# Patient Record
Sex: FEMALE | Race: WHITE | NOT HISPANIC OR LATINO | URBAN - METROPOLITAN AREA
[De-identification: names, ages, dates, MRNs, and addresses within clinical notes are randomized per-mention and may not be internally consistent; named-entity substitution may affect disease eponyms.]

---

## 2022-04-15 ENCOUNTER — APPOINTMENT (RX ONLY)
Dept: URBAN - METROPOLITAN AREA CLINIC 288 | Facility: CLINIC | Age: 72
Setting detail: DERMATOLOGY
End: 2022-04-15

## 2022-04-15 DIAGNOSIS — R21 RASH AND OTHER NONSPECIFIC SKIN ERUPTION: ICD-10-CM | Status: INADEQUATELY CONTROLLED

## 2022-04-15 PROCEDURE — ? COUNSELING

## 2022-04-15 PROCEDURE — 99203 OFFICE O/P NEW LOW 30 MIN: CPT

## 2022-04-15 PROCEDURE — ? PRESCRIPTION

## 2022-04-15 PROCEDURE — ? FULL BODY SKIN EXAM - DECLINED

## 2022-04-15 PROCEDURE — ? PRESCRIPTION MEDICATION MANAGEMENT

## 2022-04-15 RX ORDER — HYDROXYZINE HYDROCHLORIDE 25 MG/1
TABLET, FILM COATED ORAL QHS
Qty: 60 | Refills: 1 | Status: ERX | COMMUNITY
Start: 2022-04-15

## 2022-04-15 RX ORDER — CETIRIZINE HCL 10 MG/1
CAPSULE ORAL
Qty: 60 | Refills: 1 | Status: ERX | COMMUNITY
Start: 2022-04-15

## 2022-04-15 RX ORDER — TRIAMCINOLONE ACETONIDE 1 MG/G
CREAM TOPICAL BID
Qty: 80 | Refills: 1 | Status: ERX | COMMUNITY
Start: 2022-04-15

## 2022-04-15 RX ORDER — PREDNISONE 10 MG/1
TABLET ORAL QD
Qty: 33 | Refills: 0 | Status: ERX | COMMUNITY
Start: 2022-04-15

## 2022-04-15 RX ADMIN — TRIAMCINOLONE ACETONIDE: 1 CREAM TOPICAL at 00:00

## 2022-04-15 RX ADMIN — CETIRIZINE HCL: 10 CAPSULE ORAL at 00:00

## 2022-04-15 RX ADMIN — HYDROXYZINE HYDROCHLORIDE: 25 TABLET, FILM COATED ORAL at 00:00

## 2022-04-15 RX ADMIN — PREDNISONE: 10 TABLET ORAL at 00:00

## 2022-04-15 ASSESSMENT — LOCATION SIMPLE DESCRIPTION DERM
LOCATION SIMPLE: RIGHT LOWER BACK
LOCATION SIMPLE: CHEST
LOCATION SIMPLE: RIGHT FOREARM
LOCATION SIMPLE: LEFT FOREARM
LOCATION SIMPLE: RIGHT POSTERIOR THIGH
LOCATION SIMPLE: LEFT POSTERIOR THIGH

## 2022-04-15 ASSESSMENT — LOCATION DETAILED DESCRIPTION DERM
LOCATION DETAILED: RIGHT SUPERIOR MEDIAL MIDBACK
LOCATION DETAILED: LEFT MEDIAL SUPERIOR CHEST
LOCATION DETAILED: LEFT VENTRAL PROXIMAL FOREARM
LOCATION DETAILED: RIGHT PROXIMAL POSTERIOR THIGH
LOCATION DETAILED: RIGHT VENTRAL PROXIMAL FOREARM
LOCATION DETAILED: LEFT DISTAL POSTERIOR THIGH

## 2022-04-15 ASSESSMENT — LOCATION ZONE DERM
LOCATION ZONE: ARM
LOCATION ZONE: LEG
LOCATION ZONE: TRUNK

## 2022-04-15 NOTE — PROCEDURE: MIPS QUALITY
Detail Level: Detailed
Quality 226: Preventive Care And Screening: Tobacco Use: Screening And Cessation Intervention: Tobacco Screening not Performed for Medical Reasons
Quality 130: Documentation Of Current Medications In The Medical Record: Current Medications Documented
Quality 128: Preventive Care And Screening: Body Mass Index (Bmi) Screening And Follow-Up Plan: BMI not documented, reason not otherwise specified.
Quality 431: Preventive Care And Screening: Unhealthy Alcohol Use - Screening: Patient not identified as an unhealthy alcohol user when screened for unhealthy alcohol use using a systematic screening method

## 2022-04-15 NOTE — PROCEDURE: PRESCRIPTION MEDICATION MANAGEMENT
Detail Level: Zone
Initiate Treatment: Prednisone 10 mg tablet PO Frequency: QD Sig: take 3 pills daily x 5 days, then take 2 pills daily x 5 days, then take 1 pill daily x 5 days, then 1 pill every other day x 5 days. Take with food.\\nTriamcinolone acetonide 0.1 % topical cream TP Frequency: BID Sig: Apply to rash on body bid for up to 2 weeks, then PRN. Not for face, underarms, or groin.\\nHydroxyzine HCl 25 mg tablet PO Frequency: QHS Sig: Take 1-2 tablets before bed as needed for itching\\n All Day Allergy (cetirizine) 10 mg capsule PO Sig: Take up 2 tablets daily for itching\\n
Render In Strict Bullet Format?: No
Discontinue Regimen: All vitamins, supplements, protein powders and unnecessary medications for now.  Once resolved for 1 mo may add back into routine 1 per week.
Plan: No new meds, dose adjustments, personal care products or recent illnesses.\\nNo history of asthma, allergies, or eczema.\\nRash made worse by scratching\\nShe will follow up in 2 weeks if no resolution or worsening\\nSpironolactone and levothyroxine alternatives if recurrence or no resolution and allergy referral

## 2022-07-11 ENCOUNTER — APPOINTMENT (RX ONLY)
Dept: URBAN - METROPOLITAN AREA CLINIC 288 | Facility: CLINIC | Age: 72
Setting detail: DERMATOLOGY
End: 2022-07-11

## 2022-07-11 DIAGNOSIS — L30.0 NUMMULAR DERMATITIS: ICD-10-CM | Status: INADEQUATELY CONTROLLED

## 2022-07-11 PROCEDURE — ? ADDITIONAL NOTES

## 2022-07-11 PROCEDURE — ? PRESCRIPTION

## 2022-07-11 PROCEDURE — 99214 OFFICE O/P EST MOD 30 MIN: CPT

## 2022-07-11 PROCEDURE — ? COUNSELING

## 2022-07-11 RX ORDER — BETAMETHASONE DIPROPIONATE 0.5 MG/G
CREAM, AUGMENTED TOPICAL
Qty: 50 | Refills: 2 | Status: ERX | COMMUNITY
Start: 2022-07-11

## 2022-07-11 RX ADMIN — BETAMETHASONE DIPROPIONATE: 0.5 CREAM, AUGMENTED TOPICAL at 00:00

## 2022-07-11 NOTE — PROCEDURE: ADDITIONAL NOTES
Detail Level: Simple
Additional Notes: Recommend taking vitamin E\\nPatient thanks hot showers , will discontinue \\nDiscontinue taking claritin and start Xyzal QD\\nNo biopsy performed today, but if no change in two weeks will do a punch biopsy
Render Risk Assessment In Note?: no

## 2022-08-15 ENCOUNTER — APPOINTMENT (RX ONLY)
Dept: URBAN - METROPOLITAN AREA CLINIC 288 | Facility: CLINIC | Age: 72
Setting detail: DERMATOLOGY
End: 2022-08-15

## 2022-08-15 DIAGNOSIS — L30.0 NUMMULAR DERMATITIS: ICD-10-CM | Status: INADEQUATELY CONTROLLED

## 2022-08-15 PROBLEM — L30.9 DERMATITIS, UNSPECIFIED: Status: ACTIVE | Noted: 2022-08-15

## 2022-08-15 PROCEDURE — 11104 PUNCH BX SKIN SINGLE LESION: CPT

## 2022-08-15 PROCEDURE — 11105 PUNCH BX SKIN EA SEP/ADDL: CPT

## 2022-08-15 PROCEDURE — ? COUNSELING

## 2022-08-15 PROCEDURE — ? PRESCRIPTION MEDICATION MANAGEMENT

## 2022-08-15 PROCEDURE — ? BIOPSY BY PUNCH METHOD

## 2022-08-15 PROCEDURE — ? PRESCRIPTION

## 2022-08-15 RX ORDER — HYDROXYZINE HYDROCHLORIDE 25 MG/1
TABLET, FILM COATED ORAL
Qty: 60 | Refills: 1 | Status: ERX

## 2022-08-15 ASSESSMENT — LOCATION SIMPLE DESCRIPTION DERM
LOCATION SIMPLE: RIGHT UPPER BACK
LOCATION SIMPLE: LEFT UPPER BACK

## 2022-08-15 ASSESSMENT — LOCATION DETAILED DESCRIPTION DERM
LOCATION DETAILED: RIGHT SUPERIOR UPPER BACK
LOCATION DETAILED: LEFT MID-UPPER BACK

## 2022-08-15 ASSESSMENT — LOCATION ZONE DERM: LOCATION ZONE: TRUNK

## 2022-08-15 NOTE — PROCEDURE: PRESCRIPTION MEDICATION MANAGEMENT
Plan: No improvement with betamethasone cream\\nConsider MTX
Render In Strict Bullet Format?: No
Detail Level: Zone

## 2022-08-29 ENCOUNTER — APPOINTMENT (RX ONLY)
Dept: URBAN - METROPOLITAN AREA CLINIC 288 | Facility: CLINIC | Age: 72
Setting detail: DERMATOLOGY
End: 2022-08-29

## 2022-08-29 DIAGNOSIS — Z48.02 ENCOUNTER FOR REMOVAL OF SUTURES: ICD-10-CM

## 2022-08-29 PROCEDURE — ? SUTURE REMOVAL (GLOBAL PERIOD)

## 2022-08-29 PROCEDURE — 99024 POSTOP FOLLOW-UP VISIT: CPT

## 2022-08-29 ASSESSMENT — LOCATION ZONE DERM: LOCATION ZONE: TRUNK

## 2022-08-29 ASSESSMENT — LOCATION SIMPLE DESCRIPTION DERM
LOCATION SIMPLE: RIGHT UPPER BACK
LOCATION SIMPLE: LEFT UPPER BACK

## 2022-08-29 ASSESSMENT — LOCATION DETAILED DESCRIPTION DERM
LOCATION DETAILED: RIGHT SUPERIOR UPPER BACK
LOCATION DETAILED: LEFT MID-UPPER BACK

## 2022-08-29 NOTE — PROCEDURE: SUTURE REMOVAL (GLOBAL PERIOD)
Detail Level: Detailed
Add 78500 Cpt? (Important Note: In 2017 The Use Of 56705 Is Being Tracked By Cms To Determine Future Global Period Reimbursement For Global Periods): yes

## 2022-09-15 ENCOUNTER — APPOINTMENT (RX ONLY)
Dept: URBAN - METROPOLITAN AREA CLINIC 288 | Facility: CLINIC | Age: 72
Setting detail: DERMATOLOGY
End: 2022-09-15

## 2022-09-15 NOTE — PROCEDURE: MIPS QUALITY
Quality 226: Preventive Care And Screening: Tobacco Use: Screening And Cessation Intervention: Patient screened for tobacco use and is an ex/non-smoker
Quality 111:Pneumonia Vaccination Status For Older Adults: Pneumococcal vaccine (PPSV23) administered on or after patient’s 60th birthday and before the end of the measurement period
Quality 130: Documentation Of Current Medications In The Medical Record: Current Medications Documented
Quality 431: Preventive Care And Screening: Unhealthy Alcohol Use - Screening: Patient not identified as an unhealthy alcohol user when screened for unhealthy alcohol use using a systematic screening method
Quality 47: Advance Care Plan: Advance Care Planning discussed and documented; advance care plan or surrogate decision maker documented in the medical record.
Detail Level: Detailed

## 2022-10-20 ENCOUNTER — APPOINTMENT (RX ONLY)
Dept: URBAN - METROPOLITAN AREA CLINIC 10 | Facility: CLINIC | Age: 72
Setting detail: DERMATOLOGY
End: 2022-10-20

## 2022-10-20 DIAGNOSIS — L21.8 OTHER SEBORRHEIC DERMATITIS: ICD-10-CM | Status: INADEQUATELY CONTROLLED

## 2022-10-20 DIAGNOSIS — L65.0 TELOGEN EFFLUVIUM: ICD-10-CM | Status: INADEQUATELY CONTROLLED

## 2022-10-20 DIAGNOSIS — L64.8 OTHER ANDROGENIC ALOPECIA: ICD-10-CM | Status: INADEQUATELY CONTROLLED

## 2022-10-20 PROCEDURE — 99214 OFFICE O/P EST MOD 30 MIN: CPT

## 2022-10-20 PROCEDURE — ? COUNSELING

## 2022-10-20 PROCEDURE — ? PRESCRIPTION

## 2022-10-20 PROCEDURE — ? PRESCRIPTION MEDICATION MANAGEMENT

## 2022-10-20 RX ORDER — KETOCONAZOLE 20 MG/ML
2% SHAMPOO, SUSPENSION TOPICAL BID
Qty: 120 | Refills: 5 | Status: ERX | COMMUNITY
Start: 2022-10-20

## 2022-10-20 RX ORDER — FLUOCINOLONE ACETONIDE 0.11 MG/ML
0.01% OIL TOPICAL
Qty: 118.28 | Refills: 3 | Status: ERX | COMMUNITY
Start: 2022-10-20

## 2022-10-20 RX ADMIN — FLUOCINOLONE ACETONIDE 0.01%: 0.11 OIL TOPICAL at 00:00

## 2022-10-20 RX ADMIN — KETOCONAZOLE 2%: 20 SHAMPOO, SUSPENSION TOPICAL at 00:00

## 2022-10-20 ASSESSMENT — LOCATION DETAILED DESCRIPTION DERM
LOCATION DETAILED: RIGHT MEDIAL FRONTAL SCALP
LOCATION DETAILED: SUPERIOR MID FOREHEAD

## 2022-10-20 ASSESSMENT — LOCATION SIMPLE DESCRIPTION DERM
LOCATION SIMPLE: SUPERIOR FOREHEAD
LOCATION SIMPLE: RIGHT SCALP

## 2022-10-20 ASSESSMENT — LOCATION ZONE DERM
LOCATION ZONE: FACE
LOCATION ZONE: SCALP

## 2022-10-20 NOTE — PROCEDURE: PRESCRIPTION MEDICATION MANAGEMENT
Detail Level: Simple
Render In Strict Bullet Format?: No
Plan: ketoconazole 2 % shampoo 2x/week. Derma-Smoothe/FS Scalp Oil 0.01 % daily x 2 weeks, then BIW. Consider PO medications once other issues better controlled.

## 2022-10-20 NOTE — HPI: HAIR LOSS
Previous Labs: No
How Did The Hair Loss Occur?: gradual in onset
How Severe Is Your Hair Loss?: moderate
What Hair Products Do You Use?: Styling spray \\nAllergy shampoo

## 2022-10-20 NOTE — PROCEDURE: COUNSELING
Detail Level: Detailed
Patient Specific Counseling (Will Not Stick From Patient To Patient): **Test small areas of medications on antecubital fossa, then scalp before applying to full scalp
Detail Level: Zone

## 2022-10-24 RX ORDER — KETOCONAZOLE 20 MG/ML
2% SHAMPOO, SUSPENSION TOPICAL BID
Qty: 120 | Refills: 5 | Status: ERX

## 2022-10-24 RX ORDER — FLUOCINOLONE ACETONIDE 0.11 MG/ML
0.01% OIL TOPICAL
Qty: 118.28 | Refills: 3 | Status: ERX

## 2023-06-05 ENCOUNTER — APPOINTMENT (RX ONLY)
Dept: URBAN - METROPOLITAN AREA CLINIC 288 | Facility: CLINIC | Age: 73
Setting detail: DERMATOLOGY
End: 2023-06-05

## 2023-06-05 DIAGNOSIS — Z02.9 ENCOUNTER FOR ADMINISTRATIVE EXAMINATIONS, UNSPECIFIED: ICD-10-CM

## 2023-06-05 PROCEDURE — ? NO SHOW PLAN

## 2023-06-05 ASSESSMENT — LOCATION DETAILED DESCRIPTION DERM: LOCATION DETAILED: LEFT MEDIAL MALAR CHEEK

## 2023-06-05 ASSESSMENT — LOCATION ZONE DERM: LOCATION ZONE: FACE

## 2023-06-05 ASSESSMENT — LOCATION SIMPLE DESCRIPTION DERM: LOCATION SIMPLE: LEFT CHEEK

## 2023-06-12 ENCOUNTER — APPOINTMENT (RX ONLY)
Dept: URBAN - METROPOLITAN AREA CLINIC 288 | Facility: CLINIC | Age: 73
Setting detail: DERMATOLOGY
End: 2023-06-12

## 2023-06-12 DIAGNOSIS — T88.7XX: ICD-10-CM | Status: INADEQUATELY CONTROLLED

## 2023-06-12 PROBLEM — T88.7XXA UNSPECIFIED ADVERSE EFFECT OF DRUG OR MEDICAMENT, INITIAL ENCOUNTER: Status: ACTIVE | Noted: 2023-06-12

## 2023-06-12 PROCEDURE — ? COUNSELING

## 2023-06-12 PROCEDURE — 99214 OFFICE O/P EST MOD 30 MIN: CPT

## 2023-06-12 PROCEDURE — ? PRESCRIPTION MEDICATION MANAGEMENT

## 2023-06-12 PROCEDURE — ? DIAGNOSIS COMMENT

## 2023-06-12 PROCEDURE — ? PRESCRIPTION

## 2023-06-12 RX ORDER — TRIAMCINOLONE ACETONIDE 1 MG/G
CREAM TOPICAL BID
Qty: 453.6 | Refills: 1 | Status: ERX | COMMUNITY
Start: 2023-06-12

## 2023-06-12 RX ORDER — PREDNISONE 10 MG/1
TABLET ORAL
Qty: 53 | Refills: 0 | Status: ERX

## 2023-06-12 RX ADMIN — TRIAMCINOLONE ACETONIDE: 1 CREAM TOPICAL at 00:00

## 2023-06-12 ASSESSMENT — LOCATION SIMPLE DESCRIPTION DERM: LOCATION SIMPLE: LEFT UPPER BACK

## 2023-06-12 ASSESSMENT — LOCATION DETAILED DESCRIPTION DERM: LOCATION DETAILED: LEFT MEDIAL UPPER BACK

## 2023-06-12 ASSESSMENT — LOCATION ZONE DERM: LOCATION ZONE: TRUNK

## 2023-06-12 NOTE — PROCEDURE: PRESCRIPTION MEDICATION MANAGEMENT
Detail Level: Zone
Render In Strict Bullet Format?: No
Initiate Treatment: triamcinolone acetonide 0.1 % topical cream BID\\nPrednisone Taper\\nBenadryl OTC

## 2023-07-10 ENCOUNTER — APPOINTMENT (RX ONLY)
Dept: URBAN - METROPOLITAN AREA CLINIC 288 | Facility: CLINIC | Age: 73
Setting detail: DERMATOLOGY
End: 2023-07-10

## 2023-07-10 DIAGNOSIS — T88.7XX: ICD-10-CM

## 2023-07-10 PROBLEM — T88.7XXA UNSPECIFIED ADVERSE EFFECT OF DRUG OR MEDICAMENT, INITIAL ENCOUNTER: Status: ACTIVE | Noted: 2023-07-10

## 2023-07-10 PROCEDURE — ? PRESCRIPTION

## 2023-07-10 PROCEDURE — ? DIAGNOSIS COMMENT

## 2023-07-10 PROCEDURE — ? COUNSELING

## 2023-07-10 PROCEDURE — 99214 OFFICE O/P EST MOD 30 MIN: CPT

## 2023-07-10 PROCEDURE — ? PRESCRIPTION MEDICATION MANAGEMENT

## 2023-07-10 RX ORDER — TRIAMCINOLONE ACETONIDE 1 MG/G
CREAM TOPICAL BID
Qty: 453.6 | Refills: 1 | Status: ERX

## 2023-07-10 RX ORDER — CLOBETASOL PROPIONATE 0.5 MG/G
OINTMENT TOPICAL
Qty: 60 | Refills: 2 | Status: ERX | COMMUNITY
Start: 2023-07-10

## 2023-07-10 RX ADMIN — CLOBETASOL PROPIONATE: 0.5 OINTMENT TOPICAL at 00:00

## 2023-07-10 ASSESSMENT — LOCATION SIMPLE DESCRIPTION DERM: LOCATION SIMPLE: LEFT UPPER BACK

## 2023-07-10 ASSESSMENT — LOCATION ZONE DERM: LOCATION ZONE: TRUNK

## 2023-07-10 ASSESSMENT — LOCATION DETAILED DESCRIPTION DERM: LOCATION DETAILED: LEFT MEDIAL UPPER BACK

## 2023-07-10 NOTE — PROCEDURE: DIAGNOSIS COMMENT
Comment: 6/12/2023 - Rash occurred 1-2 days after eye surgery for cataracts. She does not know the medications used during the procedure. Likely trigger from one of those medications\\n\\n7/10/2023 - 90% improved after coarse of prednisone and topical steroids. Continue current therapy
Render Risk Assessment In Note?: no
Detail Level: Simple

## 2023-07-10 NOTE — PROCEDURE: PRESCRIPTION MEDICATION MANAGEMENT
Detail Level: Zone
Continue Regimen: triamcinolone acetonide 0.1 % topical cream BID\\nBenadryl OTC
Render In Strict Bullet Format?: No
Initiate Treatment: clobetasol 0.05 % topical ointment

## 2023-07-24 ENCOUNTER — APPOINTMENT (RX ONLY)
Dept: URBAN - METROPOLITAN AREA CLINIC 288 | Facility: CLINIC | Age: 73
Setting detail: DERMATOLOGY
End: 2023-07-24

## 2023-07-24 DIAGNOSIS — T88.7XX: ICD-10-CM

## 2023-07-24 PROBLEM — T88.7XXA UNSPECIFIED ADVERSE EFFECT OF DRUG OR MEDICAMENT, INITIAL ENCOUNTER: Status: ACTIVE | Noted: 2023-07-24

## 2023-07-24 PROCEDURE — ? PRESCRIPTION

## 2023-07-24 PROCEDURE — ? DIAGNOSIS COMMENT

## 2023-07-24 PROCEDURE — ? FULL BODY SKIN EXAM - DECLINED

## 2023-07-24 PROCEDURE — ? PRESCRIPTION MEDICATION MANAGEMENT

## 2023-07-24 PROCEDURE — ? COUNSELING

## 2023-07-24 PROCEDURE — 99214 OFFICE O/P EST MOD 30 MIN: CPT

## 2023-07-24 RX ORDER — TRIAMCINOLONE ACETONIDE 1 MG/G
CREAM TOPICAL BID
Qty: 453.6 | Refills: 1 | Status: ERX

## 2023-07-24 RX ORDER — CLOBETASOL PROPIONATE 0.5 MG/G
OINTMENT TOPICAL
Qty: 60 | Refills: 2 | Status: ERX | COMMUNITY
Start: 2023-07-24

## 2023-07-24 RX ORDER — PREDNISONE 10 MG/1
TABLET ORAL
Qty: 18 | Refills: 0 | Status: ERX

## 2023-07-24 RX ADMIN — CLOBETASOL PROPIONATE: 0.5 OINTMENT TOPICAL at 00:00

## 2023-07-24 ASSESSMENT — LOCATION ZONE DERM: LOCATION ZONE: TRUNK

## 2023-07-24 ASSESSMENT — LOCATION SIMPLE DESCRIPTION DERM: LOCATION SIMPLE: LEFT UPPER BACK

## 2023-07-24 ASSESSMENT — LOCATION DETAILED DESCRIPTION DERM: LOCATION DETAILED: LEFT MEDIAL UPPER BACK

## 2023-07-24 NOTE — PROCEDURE: MIPS QUALITY
Quality 226: Preventive Care And Screening: Tobacco Use: Screening And Cessation Intervention: Patient screened for tobacco use and is an ex/non-smoker
Detail Level: Detailed
Quality 130: Documentation Of Current Medications In The Medical Record: Current Medications Documented
Quality 47: Advance Care Plan: Advance care planning not documented, reason not otherwise specified.
Quality 431: Preventive Care And Screening: Unhealthy Alcohol Use - Screening: Patient not identified as an unhealthy alcohol user when screened for unhealthy alcohol use using a systematic screening method

## 2023-07-24 NOTE — PROCEDURE: DIAGNOSIS COMMENT
Comment: 6/12/2023 - Rash occurred 1-2 days after eye surgery for cataracts. She does not know the medications used during the procedure. Likely trigger from one of those medications\\n\\n7/10/2023 - 90% improved after coarse of prednisone and topical steroids. Continue current therapy\\n\\n7/24/2023 - return of rash after finishing prednisone. Will restart prednisone taper for 15 days
Render Risk Assessment In Note?: no
Detail Level: Simple

## 2023-08-14 ENCOUNTER — APPOINTMENT (RX ONLY)
Dept: URBAN - METROPOLITAN AREA CLINIC 288 | Facility: CLINIC | Age: 73
Setting detail: DERMATOLOGY
End: 2023-08-14

## 2023-08-14 DIAGNOSIS — T88.7XX: ICD-10-CM

## 2023-08-14 DIAGNOSIS — D485 NEOPLASM OF UNCERTAIN BEHAVIOR OF SKIN: ICD-10-CM

## 2023-08-14 PROBLEM — D48.5 NEOPLASM OF UNCERTAIN BEHAVIOR OF SKIN: Status: ACTIVE | Noted: 2023-08-14

## 2023-08-14 PROBLEM — T88.7XXA UNSPECIFIED ADVERSE EFFECT OF DRUG OR MEDICAMENT, INITIAL ENCOUNTER: Status: ACTIVE | Noted: 2023-08-14

## 2023-08-14 PROCEDURE — 99214 OFFICE O/P EST MOD 30 MIN: CPT | Mod: 25

## 2023-08-14 PROCEDURE — ? PRESCRIPTION MEDICATION MANAGEMENT

## 2023-08-14 PROCEDURE — ? PRESCRIPTION

## 2023-08-14 PROCEDURE — 11104 PUNCH BX SKIN SINGLE LESION: CPT

## 2023-08-14 PROCEDURE — ? BIOPSY BY PUNCH METHOD

## 2023-08-14 PROCEDURE — ? DIAGNOSIS COMMENT

## 2023-08-14 PROCEDURE — ? COUNSELING

## 2023-08-14 RX ORDER — TRIAMCINOLONE ACETONIDE 1 MG/G
CREAM TOPICAL BID
Qty: 453.6 | Refills: 1 | Status: ERX

## 2023-08-14 RX ORDER — TACROLIMUS 1 MG/G
OINTMENT TOPICAL
Qty: 60 | Refills: 3 | Status: ERX | COMMUNITY
Start: 2023-08-14

## 2023-08-14 RX ORDER — CLOBETASOL PROPIONATE 0.5 MG/G
OINTMENT TOPICAL
Qty: 60 | Refills: 2 | Status: ERX

## 2023-08-14 RX ADMIN — TACROLIMUS: 1 OINTMENT TOPICAL at 00:00

## 2023-08-14 ASSESSMENT — LOCATION DETAILED DESCRIPTION DERM
LOCATION DETAILED: LEFT MID-UPPER BACK
LOCATION DETAILED: LEFT MEDIAL UPPER BACK

## 2023-08-14 ASSESSMENT — LOCATION SIMPLE DESCRIPTION DERM: LOCATION SIMPLE: LEFT UPPER BACK

## 2023-08-14 ASSESSMENT — LOCATION ZONE DERM: LOCATION ZONE: TRUNK

## 2023-08-14 NOTE — PROCEDURE: BIOPSY BY PUNCH METHOD

## 2023-08-14 NOTE — PROCEDURE: DIAGNOSIS COMMENT
Comment: 6/12/2023 - Rash occurred 1-2 days after eye surgery for cataracts. She does not know the medications used during the procedure. Likely trigger from one of those medications\\n\\n7/10/2023 - 90% improved after coarse of prednisone and topical steroids. Continue current therapy\\n\\n7/24/2023 - return of rash after finishing prednisone. Will restart prednisone taper for 15 days.\\n\\n8/14/2023 - Return of rash after stopping prednisone, will continue topical steroids, start OTC antihistamine BID, start tacrolimus.
Render Risk Assessment In Note?: no
Detail Level: Simple

## 2023-08-14 NOTE — PROCEDURE: MIPS QUALITY
Quality 431: Preventive Care And Screening: Unhealthy Alcohol Use - Screening: Patient not identified as an unhealthy alcohol user when screened for unhealthy alcohol use using a systematic screening method
Quality 110: Preventive Care And Screening: Influenza Immunization: Influenza immunization was not ordered or administered, reason not given
Detail Level: Detailed
Quality 47: Advance Care Plan: Advance Care Planning discussed and documented; advance care plan or surrogate decision maker documented in the medical record.
Quality 226: Preventive Care And Screening: Tobacco Use: Screening And Cessation Intervention: Tobacco Screening not Performed
Quality 130: Documentation Of Current Medications In The Medical Record: Current Medications Documented

## 2023-08-14 NOTE — PROCEDURE: PRESCRIPTION MEDICATION MANAGEMENT
Detail Level: Zone
Continue Regimen: triamcinolone acetonide 0.1 % topical cream BID\\nBenadryl OTC\\nclobetasol 0.05 % topical ointment\\ntacrolimus 0.1 % topical ointment
Render In Strict Bullet Format?: No

## 2023-08-23 ENCOUNTER — APPOINTMENT (RX ONLY)
Dept: URBAN - METROPOLITAN AREA CLINIC 288 | Facility: CLINIC | Age: 73
Setting detail: DERMATOLOGY
End: 2023-08-23

## 2023-08-23 DIAGNOSIS — Z48.02 ENCOUNTER FOR REMOVAL OF SUTURES: ICD-10-CM

## 2023-08-23 PROCEDURE — ? SUTURE REMOVAL

## 2023-08-23 ASSESSMENT — LOCATION DETAILED DESCRIPTION DERM: LOCATION DETAILED: LEFT INFERIOR UPPER BACK

## 2023-08-23 ASSESSMENT — LOCATION SIMPLE DESCRIPTION DERM: LOCATION SIMPLE: LEFT UPPER BACK

## 2023-08-23 ASSESSMENT — LOCATION ZONE DERM: LOCATION ZONE: TRUNK

## 2023-08-31 NOTE — PROCEDURE: DIAGNOSIS COMMENT
----- Message from Greer Robbins sent at 8/31/2023  2:57 PM CDT -----  Regarding: Botox  Patient is scheduled for Botox 11/7/23    
Comment: Rash occurred 1-2 days after eye surgery for cataracts. She does not know the medications used during the procedure. Likely trigger from one of those medications. Rash today was 40% TBSA
Render Risk Assessment In Note?: no
Detail Level: Simple

## 2023-09-11 ENCOUNTER — APPOINTMENT (RX ONLY)
Dept: URBAN - METROPOLITAN AREA CLINIC 288 | Facility: CLINIC | Age: 73
Setting detail: DERMATOLOGY
End: 2023-09-11

## 2023-09-11 DIAGNOSIS — T88.7XX: ICD-10-CM

## 2023-09-11 PROBLEM — L30.9 DERMATITIS, UNSPECIFIED: Status: ACTIVE | Noted: 2023-09-11

## 2023-09-11 PROCEDURE — ? COUNSELING

## 2023-09-11 PROCEDURE — ? DIAGNOSIS COMMENT

## 2023-09-11 PROCEDURE — 99214 OFFICE O/P EST MOD 30 MIN: CPT

## 2023-09-11 PROCEDURE — ? PRESCRIPTION

## 2023-09-11 PROCEDURE — ? FULL BODY SKIN EXAM - DECLINED

## 2023-09-11 RX ORDER — CLOBETASOL PROPIONATE 0.5 MG/G
OINTMENT TOPICAL
Qty: 60 | Refills: 2 | Status: ERX

## 2023-09-11 ASSESSMENT — LOCATION SIMPLE DESCRIPTION DERM: LOCATION SIMPLE: LEFT UPPER BACK

## 2023-09-11 ASSESSMENT — LOCATION ZONE DERM: LOCATION ZONE: TRUNK

## 2023-09-11 ASSESSMENT — LOCATION DETAILED DESCRIPTION DERM: LOCATION DETAILED: LEFT MEDIAL UPPER BACK

## 2023-09-11 NOTE — PROCEDURE: DIAGNOSIS COMMENT
Comment: 6/12/2023 - Rash occurred 1-2 days after eye surgery for cataracts. She does not know the medications used during the procedure. Likely trigger from one of those medications\\n\\n7/10/2023 - 90% improved after coarse of prednisone and topical steroids. Continue current therapy\\n\\n7/24/2023 - return of rash after finishing prednisone. Will restart prednisone taper for 15 days.\\n\\n8/14/2023 - Return of rash after stopping prednisone, will continue topical steroids, start OTC antihistamine BID, start tacrolimus.\\n\\n9/11/2023 - 90-95% improved, will continue topical steroids. lingering itchiness on arms, no visible rash
Render Risk Assessment In Note?: no
Detail Level: Simple

## 2023-09-11 NOTE — PROCEDURE: MIPS QUALITY
Quality 130: Documentation Of Current Medications In The Medical Record: Current Medications Documented
Quality 431: Preventive Care And Screening: Unhealthy Alcohol Use - Screening: Patient identified as an unhealthy alcohol user when screened for unhealthy alcohol use using a systematic screening method and received brief counseling
Detail Level: Detailed
Quality 110: Preventive Care And Screening: Influenza Immunization: Influenza Immunization Administered during Influenza season
Quality 226: Preventive Care And Screening: Tobacco Use: Screening And Cessation Intervention: Tobacco Screening not Performed

## 2024-03-29 ENCOUNTER — APPOINTMENT (RX ONLY)
Dept: URBAN - METROPOLITAN AREA CLINIC 302 | Facility: CLINIC | Age: 74
Setting detail: DERMATOLOGY
End: 2024-03-29

## 2024-03-29 DIAGNOSIS — L30.9 DERMATITIS, UNSPECIFIED: ICD-10-CM | Status: INADEQUATELY CONTROLLED

## 2024-03-29 DIAGNOSIS — R23.3 SPONTANEOUS ECCHYMOSES: ICD-10-CM | Status: STABLE

## 2024-03-29 DIAGNOSIS — L40.0 PSORIASIS VULGARIS: ICD-10-CM | Status: INADEQUATELY CONTROLLED

## 2024-03-29 PROCEDURE — 11104 PUNCH BX SKIN SINGLE LESION: CPT

## 2024-03-29 PROCEDURE — 99214 OFFICE O/P EST MOD 30 MIN: CPT | Mod: 25

## 2024-03-29 PROCEDURE — ? PRESCRIPTION

## 2024-03-29 PROCEDURE — ? BIOPSY BY PUNCH METHOD

## 2024-03-29 PROCEDURE — 11105 PUNCH BX SKIN EA SEP/ADDL: CPT

## 2024-03-29 PROCEDURE — ? COUNSELING

## 2024-03-29 PROCEDURE — ? MDM - TREATMENT GOALS

## 2024-03-29 PROCEDURE — ? PRESCRIPTION MEDICATION MANAGEMENT

## 2024-03-29 RX ORDER — TACROLIMUS 1 MG/G
OINTMENT TOPICAL
Qty: 100 | Refills: 3 | Status: ERX | COMMUNITY
Start: 2024-03-29

## 2024-03-29 RX ORDER — CLOBETASOL PROPIONATE 0.5 MG/G
CREAM TOPICAL BID
Qty: 60 | Refills: 3 | Status: ERX | COMMUNITY
Start: 2024-03-29

## 2024-03-29 RX ADMIN — TACROLIMUS: 1 OINTMENT TOPICAL at 00:00

## 2024-03-29 RX ADMIN — CLOBETASOL PROPIONATE: 0.5 CREAM TOPICAL at 00:00

## 2024-03-29 ASSESSMENT — LOCATION ZONE DERM
LOCATION ZONE: LEG
LOCATION ZONE: AXILLAE
LOCATION ZONE: ARM
LOCATION ZONE: TRUNK

## 2024-03-29 ASSESSMENT — LOCATION DETAILED DESCRIPTION DERM
LOCATION DETAILED: RIGHT ANTECUBITAL SKIN
LOCATION DETAILED: LEFT VENTRAL PROXIMAL FOREARM
LOCATION DETAILED: LEFT SUPRAPUBIC SKIN
LOCATION DETAILED: RIGHT VENTRAL PROXIMAL FOREARM
LOCATION DETAILED: LEFT POSTERIOR AXILLA
LOCATION DETAILED: LEFT ANTERIOR PROXIMAL THIGH
LOCATION DETAILED: RIGHT PROXIMAL RADIAL DORSAL FOREARM
LOCATION DETAILED: LEFT PROXIMAL DORSAL FOREARM
LOCATION DETAILED: RIGHT AXILLARY VAULT
LOCATION DETAILED: RIGHT ANTERIOR PROXIMAL THIGH
LOCATION DETAILED: RIGHT PROXIMAL DORSAL FOREARM
LOCATION DETAILED: LEFT MEDIAL SUPERIOR CHEST

## 2024-03-29 ASSESSMENT — LOCATION SIMPLE DESCRIPTION DERM
LOCATION SIMPLE: GROIN
LOCATION SIMPLE: RIGHT AXILLARY VAULT
LOCATION SIMPLE: RIGHT THIGH
LOCATION SIMPLE: RIGHT UPPER ARM
LOCATION SIMPLE: LEFT POSTERIOR AXILLA
LOCATION SIMPLE: RIGHT FOREARM
LOCATION SIMPLE: CHEST
LOCATION SIMPLE: LEFT THIGH
LOCATION SIMPLE: LEFT FOREARM

## 2024-03-29 ASSESSMENT — BSA PSORIASIS: % BODY COVERED IN PSORIASIS: 75

## 2024-03-29 ASSESSMENT — ITCH NUMERIC RATING SCALE: HOW SEVERE IS YOUR ITCHING?: 10

## 2024-03-29 ASSESSMENT — BSA RASH: BSA RASH: 75

## 2024-03-29 NOTE — PROCEDURE: PRESCRIPTION MEDICATION MANAGEMENT
Render In Strict Bullet Format?: No
Initiate Treatment: tacrolimus 0.1 % topical ointment \\nclobetasol 0.05 % topical cream BID
Detail Level: Zone
Plan: Discussed possibility of drug eruption if bx comes back as psoriasis or eczematous eruptions consider dupixent vs rinvoq

## 2024-03-29 NOTE — PROCEDURE: BIOPSY BY PUNCH METHOD
Detail Level: Detailed
Was A Bandage Applied: Yes
Punch Size In Mm: 4
Size Of Lesion In Cm (Optional): 1.2
X Size Of Lesion In Cm (Optional): 0
Depth Of Punch Biopsy: dermis
Biopsy Type: H and E
Anesthesia Type: 1% lidocaine with epinephrine
Anesthesia Volume In Cc: 0.5
Hemostasis: None
Epidermal Sutures: 4-0 Ethilon
Wound Care: Petrolatum
Dressing: bandage
Suture Removal: 14 days
Patient Will Remove Sutures At Home?: No
Lab: 6
Lab Facility: [id number]
Consent: Verbal consent was obtained and risks were reviewed including but not limited to scarring, infection, bleeding, scabbing, incomplete removal, nerve damage and allergy to anesthesia.
Post-Care Instructions: I reviewed with the patient in detail post-care instructions. Patient is to keep the biopsy site dry overnight, and then apply bacitracin twice daily until healed. Patient may apply hydrogen peroxide soaks to remove any crusting.
Home Suture Removal Text: Patient was provided a home suture removal kit and will remove their sutures at home.  If they have any questions or difficulties they will call the office.
Notification Instructions: Patient will be notified of biopsy results. However, patient instructed to call the office if not contacted within 2 weeks.
Billing Type: Third-Party Bill
Information: Selecting Yes will display possible errors in your note based on the variables you have selected. This validation is only offered as a suggestion for you. PLEASE NOTE THAT THE VALIDATION TEXT WILL BE REMOVED WHEN YOU FINALIZE YOUR NOTE. IF YOU WANT TO FAX A PRELIMINARY NOTE YOU WILL NEED TO TOGGLE THIS TO 'NO' IF YOU DO NOT WANT IT IN YOUR FAXED NOTE.
Size Of Lesion In Cm (Optional): 1.1

## 2024-04-05 ENCOUNTER — APPOINTMENT (RX ONLY)
Dept: URBAN - METROPOLITAN AREA CLINIC 288 | Facility: CLINIC | Age: 74
Setting detail: DERMATOLOGY
End: 2024-04-05

## 2024-04-05 DIAGNOSIS — L20.89 OTHER ATOPIC DERMATITIS: ICD-10-CM | Status: INADEQUATELY CONTROLLED

## 2024-04-05 PROCEDURE — 99214 OFFICE O/P EST MOD 30 MIN: CPT

## 2024-04-05 PROCEDURE — ? PRESCRIPTION MEDICATION MANAGEMENT

## 2024-04-05 PROCEDURE — ? ORDER TESTS

## 2024-04-05 ASSESSMENT — SEVERITY ASSESSMENT 2020: SEVERITY 2020: MODERATE

## 2024-04-05 ASSESSMENT — ITCH NUMERIC RATING SCALE: HOW SEVERE IS YOUR ITCHING?: 9

## 2024-04-05 ASSESSMENT — BSA ECZEMA: % BODY COVERED IN ECZEMA: 60

## 2024-04-05 NOTE — PROCEDURE: PRESCRIPTION MEDICATION MANAGEMENT
Render In Strict Bullet Format?: No
Plan: Will reach out to MALLORY Villegas to form a plan for patient. She’s very uncomfortable and itchy \\nPt sent out for labs
Continue Regimen: Clobetasol 0.05% cream bid x 2 wks and layer with aquaphor ointment
Detail Level: Zone

## 2024-04-05 NOTE — PROCEDURE: ORDER TESTS
Bill For Surgical Tray: no
Expected Date Of Service: 04/05/2024
Billing Type: Third-Party Bill
Performing Laboratory: -[id number]
Lab Facility: [id number]